# Patient Record
Sex: MALE | Race: WHITE | NOT HISPANIC OR LATINO | Employment: FULL TIME | URBAN - METROPOLITAN AREA
[De-identification: names, ages, dates, MRNs, and addresses within clinical notes are randomized per-mention and may not be internally consistent; named-entity substitution may affect disease eponyms.]

---
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
## 2020-01-24 ENCOUNTER — HISTORICAL (OUTPATIENT)
Dept: LAB | Facility: HOSPITAL | Age: 20
End: 2020-01-24

## 2020-01-24 LAB
ABS NEUT (OLG): 2.62 X10(3)/MCL (ref 2.1–9.2)
ALBUMIN SERPL-MCNC: 4.3 GM/DL (ref 3.4–5)
ALBUMIN/GLOB SERPL: 1.5 RATIO (ref 1.1–2)
ALP SERPL-CCNC: 79 UNIT/L (ref 46–116)
ALT SERPL-CCNC: 142 UNIT/L (ref 12–78)
AST SERPL-CCNC: 57 UNIT/L (ref 15–37)
BASOPHILS # BLD AUTO: 0 X10(3)/MCL (ref 0–0.2)
BASOPHILS NFR BLD AUTO: 1 %
BILIRUB SERPL-MCNC: 0.4 MG/DL (ref 0.2–1)
BILIRUBIN DIRECT+TOT PNL SERPL-MCNC: 0.11 MG/DL (ref 0–0.2)
BILIRUBIN DIRECT+TOT PNL SERPL-MCNC: 0.29 MG/DL (ref 0–0.8)
BUN SERPL-MCNC: 15.9 MG/DL (ref 7–18)
CALCIUM SERPL-MCNC: 9.3 MG/DL (ref 8.5–10.1)
CHLORIDE SERPL-SCNC: 104 MMOL/L (ref 98–107)
CHOLEST SERPL-MCNC: 176 MG/DL (ref 0–200)
CHOLEST/HDLC SERPL: 5.5 {RATIO} (ref 0–5)
CO2 SERPL-SCNC: 26.6 MMOL/L (ref 21–32)
CREAT SERPL-MCNC: 1.12 MG/DL (ref 0.6–1.3)
EOSINOPHIL # BLD AUTO: 0.1 X10(3)/MCL (ref 0–0.9)
EOSINOPHIL NFR BLD AUTO: 2 %
ERYTHROCYTE [DISTWIDTH] IN BLOOD BY AUTOMATED COUNT: 11.8 % (ref 11.5–17)
GLOBULIN SER-MCNC: 2.9 GM/DL (ref 2.4–3.5)
GLUCOSE SERPL-MCNC: 94 MG/DL (ref 74–106)
HCT VFR BLD AUTO: 44.9 % (ref 42–52)
HDLC SERPL-MCNC: 32 MG/DL (ref 40–60)
HGB BLD-MCNC: 15.6 GM/DL (ref 14–18)
IMM GRANULOCYTES # BLD AUTO: 0.01 % (ref 0–0.02)
IMM GRANULOCYTES NFR BLD AUTO: 0.2 % (ref 0–0.43)
LDLC SERPL CALC-MCNC: 103 MG/DL (ref 0–129)
LYMPHOCYTES # BLD AUTO: 1.6 X10(3)/MCL (ref 0.6–4.6)
LYMPHOCYTES NFR BLD AUTO: 34 %
MCH RBC QN AUTO: 31.7 PG (ref 27–31)
MCHC RBC AUTO-ENTMCNC: 34.7 GM/DL (ref 33–36)
MCV RBC AUTO: 91.3 FL (ref 80–94)
MONOCYTES # BLD AUTO: 0.4 X10(3)/MCL (ref 0.1–1.3)
MONOCYTES NFR BLD AUTO: 9 %
NEUTROPHILS # BLD AUTO: 2.62 X10(3)/MCL (ref 1.4–7.9)
NEUTROPHILS NFR BLD AUTO: 55 %
PLATELET # BLD AUTO: 220 X10(3)/MCL (ref 130–400)
PMV BLD AUTO: 11.3 FL (ref 9.4–12.4)
POTASSIUM SERPL-SCNC: 4.2 MMOL/L (ref 3.5–5.1)
PROT SERPL-MCNC: 7.2 GM/DL (ref 6.4–8.2)
RBC # BLD AUTO: 4.92 X10(6)/MCL (ref 4.7–6.1)
SODIUM SERPL-SCNC: 144 MMOL/L (ref 136–145)
TRIGL SERPL-MCNC: 203 MG/DL
VLDLC SERPL CALC-MCNC: 41 MG/DL
WBC # SPEC AUTO: 4.8 X10(3)/MCL (ref 4.5–11.5)

## 2020-02-05 ENCOUNTER — HISTORICAL (OUTPATIENT)
Dept: LAB | Facility: HOSPITAL | Age: 20
End: 2020-02-05

## 2020-02-05 LAB
AMPHET UR QL SCN: NORMAL
BARBITURATE SCN PRESENT UR: NORMAL
BENZODIAZ UR QL SCN: NORMAL
CANNABINOIDS UR QL SCN: NORMAL
COCAINE UR QL SCN: NORMAL
OPIATES UR QL SCN: NORMAL
PCP UR QL: NORMAL
PH UR STRIP.AUTO: 5 [PH] (ref 5–7.5)

## 2020-05-28 ENCOUNTER — HISTORICAL (OUTPATIENT)
Dept: ADMINISTRATIVE | Facility: HOSPITAL | Age: 20
End: 2020-05-28

## 2020-05-28 LAB
ALP SERPL-CCNC: 72 UNIT/L (ref 46–116)
ALT SERPL-CCNC: 99 UNIT/L (ref 12–78)
AST SERPL-CCNC: 50 UNIT/L (ref 15–37)

## 2020-06-19 ENCOUNTER — HISTORICAL (OUTPATIENT)
Dept: ADMINISTRATIVE | Facility: HOSPITAL | Age: 20
End: 2020-06-19

## 2020-06-19 LAB
HAV IGM SERPL QL IA: NONREACTIVE
HBV CORE IGM SERPL QL IA: NONREACTIVE
HBV SURFACE AG SERPL QL IA: NONREACTIVE
HCV AB SERPL QL IA: NONREACTIVE
HEPATITIS PANEL INTERP: NORMAL

## 2021-04-05 ENCOUNTER — HISTORICAL (OUTPATIENT)
Dept: LAB | Facility: HOSPITAL | Age: 21
End: 2021-04-05

## 2021-04-05 LAB
ABS NEUT (OLG): 2.97 X10(3)/MCL (ref 2.1–9.2)
ALBUMIN SERPL-MCNC: 4.3 GM/DL (ref 3.5–5)
ALBUMIN/GLOB SERPL: 1.5 RATIO (ref 1.1–2)
ALP SERPL-CCNC: 69 UNIT/L (ref 40–150)
ALT SERPL-CCNC: 33 UNIT/L (ref 0–55)
AST SERPL-CCNC: 30 UNIT/L (ref 5–34)
BASOPHILS # BLD AUTO: 0 X10(3)/MCL (ref 0–0.2)
BASOPHILS NFR BLD AUTO: 1 %
BILIRUB SERPL-MCNC: 0.4 MG/DL
BILIRUBIN DIRECT+TOT PNL SERPL-MCNC: 0.1 MG/DL (ref 0–0.5)
BILIRUBIN DIRECT+TOT PNL SERPL-MCNC: 0.3 MG/DL (ref 0–0.8)
BUN SERPL-MCNC: 17.7 MG/DL (ref 8.9–20.6)
CALCIUM SERPL-MCNC: 8.8 MG/DL (ref 8.4–10.2)
CHLORIDE SERPL-SCNC: 107 MMOL/L (ref 98–107)
CHOLEST SERPL-MCNC: 187 MG/DL
CHOLEST/HDLC SERPL: 5 {RATIO} (ref 0–5)
CO2 SERPL-SCNC: 25 MMOL/L (ref 22–29)
CREAT SERPL-MCNC: 1.1 MG/DL (ref 0.73–1.18)
DEPRECATED CALCIDIOL+CALCIFEROL SERPL-MC: 24.5 NG/ML (ref 30–80)
EOSINOPHIL # BLD AUTO: 0.1 X10(3)/MCL (ref 0–0.9)
EOSINOPHIL NFR BLD AUTO: 2 %
ERYTHROCYTE [DISTWIDTH] IN BLOOD BY AUTOMATED COUNT: 11.7 % (ref 11.5–17)
GLOBULIN SER-MCNC: 2.8 GM/DL (ref 2.4–3.5)
GLUCOSE SERPL-MCNC: 93 MG/DL (ref 74–100)
HCT VFR BLD AUTO: 45.1 % (ref 42–52)
HDLC SERPL-MCNC: 39 MG/DL (ref 35–60)
HGB BLD-MCNC: 15.2 GM/DL (ref 14–18)
IMM GRANULOCYTES # BLD AUTO: 0.02 % (ref 0–0.02)
IMM GRANULOCYTES NFR BLD AUTO: 0.4 % (ref 0–0.43)
LDLC SERPL CALC-MCNC: 111 MG/DL (ref 50–140)
LYMPHOCYTES # BLD AUTO: 1.4 X10(3)/MCL (ref 0.6–4.6)
LYMPHOCYTES NFR BLD AUTO: 29 %
MCH RBC QN AUTO: 32 PG (ref 27–31)
MCHC RBC AUTO-ENTMCNC: 33.7 GM/DL (ref 33–36)
MCV RBC AUTO: 94.9 FL (ref 80–94)
MONOCYTES # BLD AUTO: 0.4 X10(3)/MCL (ref 0.1–1.3)
MONOCYTES NFR BLD AUTO: 7 %
NEUTROPHILS # BLD AUTO: 2.97 X10(3)/MCL (ref 1.4–7.9)
NEUTROPHILS NFR BLD AUTO: 60 %
PLATELET # BLD AUTO: 187 X10(3)/MCL (ref 130–400)
PMV BLD AUTO: 10.4 FL (ref 9.4–12.4)
POTASSIUM SERPL-SCNC: 4.4 MMOL/L (ref 3.5–5.1)
PROT SERPL-MCNC: 7.1 GM/DL (ref 6.4–8.3)
RBC # BLD AUTO: 4.75 X10(6)/MCL (ref 4.7–6.1)
SODIUM SERPL-SCNC: 141 MMOL/L (ref 136–145)
TRIGL SERPL-MCNC: 186 MG/DL (ref 34–140)
TSH SERPL-ACNC: 1.99 UIU/ML (ref 0.35–4.94)
VLDLC SERPL CALC-MCNC: 37 MG/DL
WBC # SPEC AUTO: 4.9 X10(3)/MCL (ref 4.5–11.5)

## 2022-04-06 ENCOUNTER — HISTORICAL (OUTPATIENT)
Dept: LAB | Facility: HOSPITAL | Age: 22
End: 2022-04-06

## 2022-04-06 LAB
ABS NEUT (OLG): 2.76 (ref 2.1–9.2)
ALBUMIN SERPL-MCNC: 4.5 G/DL (ref 3.5–5)
ALBUMIN/GLOB SERPL: 1.6 {RATIO} (ref 1.1–2)
ALP SERPL-CCNC: 80 U/L (ref 40–150)
ALT SERPL-CCNC: 17 U/L (ref 0–55)
AST SERPL-CCNC: 19 U/L (ref 5–34)
BASOPHILS # BLD AUTO: 0.1 10*3/UL (ref 0–0.2)
BASOPHILS NFR BLD AUTO: 1 %
BILIRUB SERPL-MCNC: 0.5 MG/DL
BILIRUBIN DIRECT+TOT PNL SERPL-MCNC: 0.2 (ref 0–0.5)
BILIRUBIN DIRECT+TOT PNL SERPL-MCNC: 0.3 (ref 0–0.8)
BUN SERPL-MCNC: 17.8 MG/DL (ref 8.9–20.6)
CALCIUM SERPL-MCNC: 9.7 MG/DL (ref 8.7–10.5)
CHLORIDE SERPL-SCNC: 106 MMOL/L (ref 98–107)
CHOLEST SERPL-MCNC: 196 MG/DL
CHOLEST/HDLC SERPL: 6 {RATIO} (ref 0–5)
CO2 SERPL-SCNC: 24 MMOL/L (ref 22–29)
CREAT SERPL-MCNC: 1.11 MG/DL (ref 0.73–1.18)
DEPRECATED CALCIDIOL+CALCIFEROL SERPL-MC: 24.9 NG/ML (ref 30–80)
EOSINOPHIL # BLD AUTO: 0.3 10*3/UL (ref 0–0.9)
EOSINOPHIL NFR BLD AUTO: 6 %
ERYTHROCYTE [DISTWIDTH] IN BLOOD BY AUTOMATED COUNT: 12 % (ref 11.5–17)
EST. AVERAGE GLUCOSE BLD GHB EST-MCNC: 91.1 MG/DL
GLOBULIN SER-MCNC: 2.8 G/DL (ref 2.4–3.5)
GLUCOSE SERPL-MCNC: 97 MG/DL (ref 74–100)
HBA1C MFR BLD: 4.8 %
HCT VFR BLD AUTO: 45.2 % (ref 42–52)
HDLC SERPL-MCNC: 35 MG/DL (ref 35–60)
HEMOLYSIS INTERF INDEX SERPL-ACNC: 6
HGB BLD-MCNC: 15.7 G/DL (ref 14–18)
ICTERIC INTERF INDEX SERPL-ACNC: 1
IMM GRANULOCYTES # BLD AUTO: 0.01 10*3/UL (ref 0–0.02)
IMM GRANULOCYTES NFR BLD AUTO: 0.2 % (ref 0–0.43)
LDLC SERPL CALC-MCNC: 128 MG/DL (ref 50–140)
LIPEMIC INTERF INDEX SERPL-ACNC: 11
LYMPHOCYTES # BLD AUTO: 1.6 10*3/UL (ref 0.6–4.6)
LYMPHOCYTES NFR BLD AUTO: 30 %
MANUAL DIFF? (OHS): NO
MCH RBC QN AUTO: 31 PG (ref 27–31)
MCHC RBC AUTO-ENTMCNC: 34.7 G/DL (ref 33–36)
MCV RBC AUTO: 89.3 FL (ref 80–94)
MONOCYTES # BLD AUTO: 0.4 10*3/UL (ref 0.1–1.3)
MONOCYTES NFR BLD AUTO: 8 %
NEUTROPHILS # BLD AUTO: 2.76 10*3/UL (ref 1.4–7.9)
NEUTROPHILS NFR BLD AUTO: 54 %
PLATELET # BLD AUTO: 209 10*3/UL (ref 130–400)
PMV BLD AUTO: 10.3 FL (ref 9.4–12.4)
POTASSIUM SERPL-SCNC: 4.1 MMOL/L (ref 3.5–5.1)
PROT SERPL-MCNC: 7.3 G/DL (ref 6.4–8.3)
RBC # BLD AUTO: 5.06 10*6/UL (ref 4.7–6.1)
SODIUM SERPL-SCNC: 140 MMOL/L (ref 136–145)
TRIGL SERPL-MCNC: 166 MG/DL (ref 34–140)
TSH SERPL-ACNC: 2.79 M[IU]/L (ref 0.35–4.94)
VIT B12 SERPL-MCNC: 643 PG/ML (ref 213–816)
VLDLC SERPL CALC-MCNC: 33 MG/DL
WBC # SPEC AUTO: 5.1 10*3/UL (ref 4.5–11.5)

## 2022-04-10 ENCOUNTER — HISTORICAL (OUTPATIENT)
Dept: ADMINISTRATIVE | Facility: HOSPITAL | Age: 22
End: 2022-04-10

## 2022-04-26 VITALS
SYSTOLIC BLOOD PRESSURE: 132 MMHG | WEIGHT: 225.31 LBS | BODY MASS INDEX: 31.54 KG/M2 | HEIGHT: 71 IN | OXYGEN SATURATION: 98 % | DIASTOLIC BLOOD PRESSURE: 76 MMHG

## 2022-07-25 RX ORDER — MUPIROCIN 20 MG/G
OINTMENT TOPICAL 3 TIMES DAILY
COMMUNITY
Start: 2022-07-19 | End: 2022-11-29

## 2022-07-25 RX ORDER — OXCARBAZEPINE 600 MG/1
600 TABLET, FILM COATED ORAL 2 TIMES DAILY
COMMUNITY
Start: 2022-04-11 | End: 2022-08-01 | Stop reason: SDUPTHER

## 2022-07-25 RX ORDER — SERTRALINE HYDROCHLORIDE 100 MG/1
100 TABLET, FILM COATED ORAL DAILY
Qty: 90 TABLET | Refills: 0 | Status: SHIPPED | OUTPATIENT
Start: 2022-07-25 | End: 2023-04-17 | Stop reason: SDUPTHER

## 2022-07-25 RX ORDER — AMOXICILLIN AND CLAVULANATE POTASSIUM 875; 125 MG/1; MG/1
1 TABLET, FILM COATED ORAL 2 TIMES DAILY
COMMUNITY
Start: 2022-07-19 | End: 2022-11-29 | Stop reason: ALTCHOICE

## 2022-07-25 RX ORDER — SERTRALINE HYDROCHLORIDE 100 MG/1
100 TABLET, FILM COATED ORAL DAILY
COMMUNITY
Start: 2022-04-11 | End: 2022-07-25 | Stop reason: SDUPTHER

## 2022-08-02 RX ORDER — OXCARBAZEPINE 600 MG/1
600 TABLET, FILM COATED ORAL 2 TIMES DAILY
Qty: 180 TABLET | Refills: 3 | Status: SHIPPED | OUTPATIENT
Start: 2022-08-02 | End: 2023-04-17 | Stop reason: SDUPTHER

## 2022-11-29 ENCOUNTER — OFFICE VISIT (OUTPATIENT)
Dept: FAMILY MEDICINE | Facility: CLINIC | Age: 22
End: 2022-11-29
Payer: COMMERCIAL

## 2022-11-29 VITALS
OXYGEN SATURATION: 98 % | TEMPERATURE: 98 F | SYSTOLIC BLOOD PRESSURE: 119 MMHG | BODY MASS INDEX: 28.71 KG/M2 | HEART RATE: 89 BPM | RESPIRATION RATE: 16 BRPM | WEIGHT: 212 LBS | DIASTOLIC BLOOD PRESSURE: 79 MMHG | HEIGHT: 72 IN

## 2022-11-29 DIAGNOSIS — R09.81 NASAL CONGESTION: ICD-10-CM

## 2022-11-29 DIAGNOSIS — J01.10 ACUTE FRONTAL SINUSITIS, RECURRENCE NOT SPECIFIED: Primary | ICD-10-CM

## 2022-11-29 PROCEDURE — 99213 PR OFFICE/OUTPT VISIT, EST, LEVL III, 20-29 MIN: ICD-10-PCS | Mod: ,,, | Performed by: NURSE PRACTITIONER

## 2022-11-29 PROCEDURE — 99213 OFFICE O/P EST LOW 20 MIN: CPT | Mod: ,,, | Performed by: NURSE PRACTITIONER

## 2022-11-29 PROCEDURE — 3078F DIAST BP <80 MM HG: CPT | Mod: CPTII,,, | Performed by: NURSE PRACTITIONER

## 2022-11-29 PROCEDURE — 1159F PR MEDICATION LIST DOCUMENTED IN MEDICAL RECORD: ICD-10-PCS | Mod: CPTII,,, | Performed by: NURSE PRACTITIONER

## 2022-11-29 PROCEDURE — 3008F PR BODY MASS INDEX (BMI) DOCUMENTED: ICD-10-PCS | Mod: CPTII,,, | Performed by: NURSE PRACTITIONER

## 2022-11-29 PROCEDURE — 3078F PR MOST RECENT DIASTOLIC BLOOD PRESSURE < 80 MM HG: ICD-10-PCS | Mod: CPTII,,, | Performed by: NURSE PRACTITIONER

## 2022-11-29 PROCEDURE — 3074F PR MOST RECENT SYSTOLIC BLOOD PRESSURE < 130 MM HG: ICD-10-PCS | Mod: CPTII,,, | Performed by: NURSE PRACTITIONER

## 2022-11-29 PROCEDURE — 1159F MED LIST DOCD IN RCRD: CPT | Mod: CPTII,,, | Performed by: NURSE PRACTITIONER

## 2022-11-29 PROCEDURE — 3008F BODY MASS INDEX DOCD: CPT | Mod: CPTII,,, | Performed by: NURSE PRACTITIONER

## 2022-11-29 PROCEDURE — 3074F SYST BP LT 130 MM HG: CPT | Mod: CPTII,,, | Performed by: NURSE PRACTITIONER

## 2022-11-29 RX ORDER — BENZONATATE 200 MG/1
200 CAPSULE ORAL 3 TIMES DAILY PRN
Qty: 30 CAPSULE | Refills: 0 | Status: SHIPPED | OUTPATIENT
Start: 2022-11-29 | End: 2022-12-09

## 2022-11-29 RX ORDER — PREDNISONE 20 MG/1
TABLET ORAL
Qty: 8 TABLET | Refills: 0 | Status: SHIPPED | OUTPATIENT
Start: 2022-11-29

## 2022-11-29 RX ORDER — AMOXICILLIN AND CLAVULANATE POTASSIUM 875; 125 MG/1; MG/1
1 TABLET, FILM COATED ORAL EVERY 12 HOURS
Qty: 14 TABLET | Refills: 0 | Status: SHIPPED | OUTPATIENT
Start: 2022-11-29 | End: 2022-12-06

## 2022-11-29 NOTE — PROGRESS NOTES
SUBJECTIVE:     History of Present Illness      Chief Complaint: Follow-up (C/O cough, congestion, sore throat, headache x 1 day.  No fever.  Productive cough at times.)    HPI:  Patient is a 22 y.o. year old male who presents to clinic for nasal congestion ,sore throat ,cough and headache for the last day.    Patient denies fever denies chills denies loss of taste or smell. . Denies sick contact    Review of Systems:  General: Denies fever, chills, fatigue, myalgias, and change in appetite   Eyes: Denies change in vision, eye redness, eye drainage, eye pain  ENT:  +rhinorrhea, nasal congestion, sore throat,   Resp: Denies wheezing, and shortness of breath   Cardio: Denies chest pain, palpitations, pleuritic pain, and edema   GI: Denies nausea, vomiting, diarrhea, and abdominal pain   : Denies dysuria, hematuria, and discharge   MSK: Denies trauma, joint pain, and trouble ambulating   Neuro: Denies LOC, dizziness, seizure like activity, and focal deficits   Skin: Denies rashes, open lesions and ulcers      Previous History      Review of patient's allergies indicates:  No Known Allergies    Past Medical History:   Diagnosis Date    Depression     Seizures      Current Outpatient Medications   Medication Instructions    amoxicillin-clavulanate 875-125mg (AUGMENTIN) 875-125 mg per tablet 1 tablet, Oral, 2 times daily    amoxicillin-clavulanate 875-125mg (AUGMENTIN) 875-125 mg per tablet 1 tablet, Oral, Every 12 hours    benzonatate (TESSALON) 200 mg, Oral, 3 times daily PRN    mupirocin (BACTROBAN) 2 % ointment Topical (Top), 3 times daily    OXcarbazepine (TRILEPTAL) 600 mg, Oral, 2 times daily    predniSONE (DELTASONE) 20 MG tablet One tablet orally twice daily for 3 days and then once daily for 2 days    sertraline (ZOLOFT) 100 mg, Oral, Daily     Past Surgical History:   Procedure Laterality Date    TONSILLECTOMY       History reviewed. No pertinent family history.    Social History     Tobacco Use    Smoking  status: Never    Smokeless tobacco: Never   Substance Use Topics    Alcohol use: Yes     Comment: socially once a week    Drug use: Never        Health Maintenance      Health Maintenance   Topic Date Due    TETANUS VACCINE  Never done    Hepatitis C Screening  Completed    Lipid Panel  Completed    HPV Vaccines  Completed       OBJECTIVE:     Physical Exam      Vital Signs Reviewed   /79   Pulse 89   Temp 98.3 °F (36.8 °C)   Resp 16   Ht 6' (1.829 m)   Wt 96.2 kg (212 lb)   SpO2 98%   BMI 28.75 kg/m²     Physical Exam:  General: Alert, well nourished, no acute distress, non-toxic appearing.   Eyes: Anicteric sclera, without conjunctival injection, normal lids, no purulent drainage, EOMs grossly intact.   Ears: No tragal tenderness. Tympanic membranes intact, pearly grey, without effusion or erythema and with a positive light reflex.   Mouth: Posterior pharynx  streaky. No exudate, ulcerations, or lesion. No tonsillar swelling.   Sinus tenderness pressure   Neck: Supple, full ROM, no rigidity, no cervical adenopathy.   Cardio: Normal rate and rhythm    Resp: Respirations even and unlabored, clear to auscultation bilaterally.   Skin: No rashes or open lesions noted.   MSK: No swelling. No abrasions or signs of trauma. Ambulating without assistance.   Neuro: Alert,oriented No focal deficits noted. Facial expressions even.   Psych: Cooperative, Normal affect      Procedures    Procedures     Labs     Results for orders placed or performed in visit on 04/06/22   CBC Auto Differential   Result Value Ref Range    WBC 5.1 4.5 - 11.5    RBC 5.06 4.70 - 6.10    Hgb 15.7 14.0 - 18.0    Hct 45.2 42.0 - 52.0    MCV 89.3 80.0 - 94.0    MCH 31.0 27.0 - 31.0    MCHC 34.7 33.0 - 36.0    RDW 12.0 11.5 - 17.0    Platelet 209 130 - 400    MPV 10.3 9.4 - 12.4    Abs Neut 2.76 2.10 - 9.20    Manual Diff? No    Differential   Result Value Ref Range    Neut % 54     Lymph % 30     Mono % 8     Eos % 6     Basophil % 1      Lymph # 1.6 0.6 - 4.6    Neut # 2.76 1.40 - 7.90    Mono # 0.4 0.1 - 1.3    Eos # 0.3 0.0 - 0.9    Baso # 0.1 0.0 - 0.2    IG# 0.0100 0.0000 - 0.0155    IG% 0.200 0.000 - 0.430   Hemoglobin A1C   Result Value Ref Range    Hemoglobin A1c 4.8 <=7.0    Estimated Average Glucose 91.1    TSH   Result Value Ref Range    Thyroid Stimulating Hormone 2.7850 0.3500 - 4.9400   Vitamin B12   Result Value Ref Range    Vitamin B12 Level 643 213 - 816   Comprehensive Metabolic Panel   Result Value Ref Range    Sodium Level 140 136 - 145    Potassium Level 4.1 3.5 - 5.1    Chloride 106 98 - 107    Carbon Dioxide 24 22 - 29    Glucose Level 97 74 - 100    Blood Urea Nitrogen 17.8 8.9 - 20.6    Creatinine 1.11 0.73 - 1.18    Calcium Level Total 9.7 8.7 - 10.5    Albumin Level 4.5 3.5 - 5.0    Protein Total 7.3 6.4 - 8.3    Globulin 2.8 2.4 - 3.5    Albumin/Globulin Ratio 1.6 1.1 - 2.0    Alkaline Phosphatase 80 40 - 150    Bilirubin Total 0.5 <=1.5    Bilirubin Direct 0.2 0.0 - 0.5    Bilirubin Indirect 0.30 0.00 - 0.80    Aspartate Aminotransferase 19 5 - 34    Alanine Aminotransferase 17 0 - 55    Hemolysis 6     Icterus 1     Lipemia 11    Lipid Panel   Result Value Ref Range    Cholesterol Total 196 <=200    HDL Cholesterol 35 35 - 60    Triglyceride 166 34 - 140    Very Low Density Lipoprotein 33     Cholesterol/HDL Ratio 6 0 - 5    LDL Cholesterol 128.00 50.00 - 140.00   GFR, Estimated   Result Value Ref Range    Estimated GFR- >60     Estimated GFR-Non African American >60    Vitamin D   Result Value Ref Range    Vit D 25 OH 24.9 30.0 - 80.0        Assessment       1. Acute frontal sinusitis, recurrence not specified    2. Nasal congestion             Plan       Orders Placed This Encounter    amoxicillin-clavulanate 875-125mg (AUGMENTIN) 875-125 mg per tablet    benzonatate (TESSALON) 200 MG capsule    predniSONE (DELTASONE) 20 MG tablet      Medication List with Changes/Refills   New Medications     AMOXICILLIN-CLAVULANATE 875-125MG (AUGMENTIN) 875-125 MG PER TABLET    Take 1 tablet by mouth every 12 (twelve) hours. for 7 days    BENZONATATE (TESSALON) 200 MG CAPSULE    Take 1 capsule (200 mg total) by mouth 3 (three) times daily as needed for Cough.    PREDNISONE (DELTASONE) 20 MG TABLET    One tablet orally twice daily for 3 days and then once daily for 2 days   Current Medications    AMOXICILLIN-CLAVULANATE 875-125MG (AUGMENTIN) 875-125 MG PER TABLET    Take 1 tablet by mouth 2 (two) times daily.    MUPIROCIN (BACTROBAN) 2 % OINTMENT    Apply topically 3 (three) times daily.    OXCARBAZEPINE (TRILEPTAL) 600 MG TAB    Take 1 tablet (600 mg total) by mouth 2 (two) times daily.    SERTRALINE (ZOLOFT) 100 MG TABLET    Take 1 tablet (100 mg total) by mouth once daily.           Future Appointments   Date Time Provider Department Center   4/14/2023  9:40 AM CHRISTIANO Marinelli

## 2023-04-10 ENCOUNTER — LAB VISIT (OUTPATIENT)
Dept: LAB | Facility: HOSPITAL | Age: 23
End: 2023-04-10
Attending: NURSE PRACTITIONER
Payer: COMMERCIAL

## 2023-04-10 ENCOUNTER — TELEPHONE (OUTPATIENT)
Dept: FAMILY MEDICINE | Facility: CLINIC | Age: 23
End: 2023-04-10
Payer: COMMERCIAL

## 2023-04-10 DIAGNOSIS — Z00.00 ENCOUNTER FOR WELLNESS EXAMINATION: ICD-10-CM

## 2023-04-10 DIAGNOSIS — Z00.00 ENCOUNTER FOR WELLNESS EXAMINATION: Primary | ICD-10-CM

## 2023-04-10 LAB
ALBUMIN SERPL-MCNC: 4.5 G/DL (ref 3.5–5)
ALBUMIN/GLOB SERPL: 1.4 RATIO (ref 1.1–2)
ALP SERPL-CCNC: 70 UNIT/L (ref 40–150)
ALT SERPL-CCNC: 18 UNIT/L (ref 0–55)
AST SERPL-CCNC: 28 UNIT/L (ref 5–34)
BASOPHILS # BLD AUTO: 0.07 X10(3)/MCL (ref 0–0.2)
BASOPHILS NFR BLD AUTO: 1.3 %
BILIRUBIN DIRECT+TOT PNL SERPL-MCNC: 0.3 MG/DL
BUN SERPL-MCNC: 14.7 MG/DL (ref 8.9–20.6)
CALCIUM SERPL-MCNC: 9.9 MG/DL (ref 8.4–10.2)
CHLORIDE SERPL-SCNC: 107 MMOL/L (ref 98–107)
CHOLEST SERPL-MCNC: 216 MG/DL
CHOLEST/HDLC SERPL: 7 {RATIO} (ref 0–5)
CO2 SERPL-SCNC: 23 MMOL/L (ref 22–29)
CREAT SERPL-MCNC: 1.16 MG/DL (ref 0.73–1.18)
DEPRECATED CALCIDIOL+CALCIFEROL SERPL-MC: 21.7 NG/ML (ref 30–80)
EOSINOPHIL # BLD AUTO: 0.24 X10(3)/MCL (ref 0–0.9)
EOSINOPHIL NFR BLD AUTO: 4.5 %
ERYTHROCYTE [DISTWIDTH] IN BLOOD BY AUTOMATED COUNT: 12.2 % (ref 11.5–17)
EST. AVERAGE GLUCOSE BLD GHB EST-MCNC: 91.1 MG/DL
GFR SERPLBLD CREATININE-BSD FMLA CKD-EPI: >60 MLS/MIN/1.73/M2
GLOBULIN SER-MCNC: 3.2 GM/DL (ref 2.4–3.5)
GLUCOSE SERPL-MCNC: 93 MG/DL (ref 74–100)
HBA1C MFR BLD: 4.8 %
HCT VFR BLD AUTO: 46.5 % (ref 42–52)
HDLC SERPL-MCNC: 32 MG/DL (ref 35–60)
HGB BLD-MCNC: 15.6 G/DL (ref 14–18)
IMM GRANULOCYTES # BLD AUTO: 0.01 X10(3)/MCL (ref 0–0.04)
IMM GRANULOCYTES NFR BLD AUTO: 0.2 %
LDLC SERPL CALC-MCNC: 83 MG/DL (ref 50–140)
LYMPHOCYTES # BLD AUTO: 1.6 X10(3)/MCL (ref 0.6–4.6)
LYMPHOCYTES NFR BLD AUTO: 29.9 %
MCH RBC QN AUTO: 30.9 PG (ref 27–31)
MCHC RBC AUTO-ENTMCNC: 33.5 G/DL (ref 33–36)
MCV RBC AUTO: 92.1 FL (ref 80–94)
MONOCYTES # BLD AUTO: 0.4 X10(3)/MCL (ref 0.1–1.3)
MONOCYTES NFR BLD AUTO: 7.5 %
NEUTROPHILS # BLD AUTO: 3.04 X10(3)/MCL (ref 2.1–9.2)
NEUTROPHILS NFR BLD AUTO: 56.6 %
PLATELET # BLD AUTO: 238 X10(3)/MCL (ref 130–400)
PMV BLD AUTO: 10.2 FL (ref 7.4–10.4)
POTASSIUM SERPL-SCNC: 4.2 MMOL/L (ref 3.5–5.1)
PROT SERPL-MCNC: 7.7 GM/DL (ref 6.4–8.3)
RBC # BLD AUTO: 5.05 X10(6)/MCL (ref 4.7–6.1)
SODIUM SERPL-SCNC: 142 MMOL/L (ref 136–145)
TRIGL SERPL-MCNC: 504 MG/DL (ref 34–140)
TSH SERPL-ACNC: 3.76 UIU/ML (ref 0.35–4.94)
VLDLC SERPL CALC-MCNC: 101 MG/DL
WBC # SPEC AUTO: 5.4 X10(3)/MCL (ref 4.5–11.5)

## 2023-04-10 PROCEDURE — 80053 COMPREHEN METABOLIC PANEL: CPT

## 2023-04-10 PROCEDURE — 80061 LIPID PANEL: CPT

## 2023-04-10 PROCEDURE — 83036 HEMOGLOBIN GLYCOSYLATED A1C: CPT

## 2023-04-10 PROCEDURE — 85025 COMPLETE CBC W/AUTO DIFF WBC: CPT

## 2023-04-10 PROCEDURE — 36415 COLL VENOUS BLD VENIPUNCTURE: CPT

## 2023-04-10 PROCEDURE — 84443 ASSAY THYROID STIM HORMONE: CPT

## 2023-04-10 PROCEDURE — 82306 VITAMIN D 25 HYDROXY: CPT

## 2023-04-14 ENCOUNTER — OFFICE VISIT (OUTPATIENT)
Dept: FAMILY MEDICINE | Facility: CLINIC | Age: 23
End: 2023-04-14
Payer: COMMERCIAL

## 2023-04-14 VITALS
HEIGHT: 72 IN | BODY MASS INDEX: 28.85 KG/M2 | WEIGHT: 213 LBS | SYSTOLIC BLOOD PRESSURE: 126 MMHG | RESPIRATION RATE: 18 BRPM | HEART RATE: 78 BPM | DIASTOLIC BLOOD PRESSURE: 79 MMHG | OXYGEN SATURATION: 96 %

## 2023-04-14 DIAGNOSIS — F84.0 AUTISM: ICD-10-CM

## 2023-04-14 DIAGNOSIS — Z00.00 WELLNESS EXAMINATION: Primary | ICD-10-CM

## 2023-04-14 DIAGNOSIS — E78.00 ELEVATED CHOLESTEROL: ICD-10-CM

## 2023-04-14 DIAGNOSIS — G40.909 SEIZURE DISORDER: ICD-10-CM

## 2023-04-14 PROCEDURE — 1159F MED LIST DOCD IN RCRD: CPT | Mod: CPTII,,, | Performed by: NURSE PRACTITIONER

## 2023-04-14 PROCEDURE — 99395 PREV VISIT EST AGE 18-39: CPT | Mod: ,,, | Performed by: NURSE PRACTITIONER

## 2023-04-14 PROCEDURE — 3074F SYST BP LT 130 MM HG: CPT | Mod: CPTII,,, | Performed by: NURSE PRACTITIONER

## 2023-04-14 PROCEDURE — 1159F PR MEDICATION LIST DOCUMENTED IN MEDICAL RECORD: ICD-10-PCS | Mod: CPTII,,, | Performed by: NURSE PRACTITIONER

## 2023-04-14 PROCEDURE — 3078F DIAST BP <80 MM HG: CPT | Mod: CPTII,,, | Performed by: NURSE PRACTITIONER

## 2023-04-14 PROCEDURE — 99395 PR PREVENTIVE VISIT,EST,18-39: ICD-10-PCS | Mod: ,,, | Performed by: NURSE PRACTITIONER

## 2023-04-14 PROCEDURE — 3008F BODY MASS INDEX DOCD: CPT | Mod: CPTII,,, | Performed by: NURSE PRACTITIONER

## 2023-04-14 PROCEDURE — 3008F PR BODY MASS INDEX (BMI) DOCUMENTED: ICD-10-PCS | Mod: CPTII,,, | Performed by: NURSE PRACTITIONER

## 2023-04-14 PROCEDURE — 3074F PR MOST RECENT SYSTOLIC BLOOD PRESSURE < 130 MM HG: ICD-10-PCS | Mod: CPTII,,, | Performed by: NURSE PRACTITIONER

## 2023-04-14 PROCEDURE — 3078F PR MOST RECENT DIASTOLIC BLOOD PRESSURE < 80 MM HG: ICD-10-PCS | Mod: CPTII,,, | Performed by: NURSE PRACTITIONER

## 2023-04-14 NOTE — PROGRESS NOTES
SUBJECTIVE:     History of Present Illness      Chief Complaint: Annual Exam (Wellness Exam /)    HPI:  Patient is a 22 y.o. year old male who presents to clinic for  for annual wellness.    PMH seizure disorder anxiety.   Reports last seizure 2016 , no seizure activity.    Doing well with medication taking   as prescribed.  Denies headaches, dizziness, chest pain, shortness of breath or palpitations.  Smoking status none  Drinks sodas few times a week.   He reports eating a lot of fast food, fried and fatty food.        Review of Systems:    Review of Systems    12 point review of systems conducted, negative except as stated in the history of present illness. See HPI for details.     Previous History      Review of patient's allergies indicates:  No Known Allergies    Past Medical History:   Diagnosis Date    Depression     Seizures      Current Outpatient Medications   Medication Instructions    OXcarbazepine (TRILEPTAL) 600 mg, Oral, 2 times daily    predniSONE (DELTASONE) 20 MG tablet One tablet orally twice daily for 3 days and then once daily for 2 days    sertraline (ZOLOFT) 100 mg, Oral, Daily     Past Surgical History:   Procedure Laterality Date    TONSILLECTOMY       History reviewed. No pertinent family history.    Social History     Tobacco Use    Smoking status: Never    Smokeless tobacco: Never   Substance Use Topics    Alcohol use: Yes     Comment: socially once a week    Drug use: Never        Health Maintenance      Health Maintenance   Topic Date Due    TETANUS VACCINE  07/19/2032    Hepatitis C Screening  Completed    Lipid Panel  Completed    HPV Vaccines  Completed       OBJECTIVE:     Physical Exam      Vital Signs Reviewed   Visit Vitals  /79 (BP Location: Left arm, Patient Position: Sitting, BP Method: Small (Automatic))   Pulse 78   Resp 18   Ht 6' (1.829 m)   Wt 96.6 kg (213 lb)   SpO2 96%   BMI 28.89 kg/m²       Physical Exam    Physical Exam:  General: Alert, well nourished, no  acute distress, non-toxic appearing.   Eyes: Anicteric sclera, without conjunctival injection, normal lids, no purulent drainage, EOMs grossly intact.   Ears: No tragal tenderness. Tympanic membranes intact, pearly grey, without effusion or erythema and with a positive light reflex.   Mouth: Posterior pharynx without erythema. No exudate, ulcerations, or lesion. No tonsillar swelling.   Neck: Supple, full ROM, no rigidity, no cervical adenopathy.   Cardio: Normal rate and rhythm    Resp: Respirations even and unlabored, clear to auscultation bilaterally.   Abd: No ecchymosis or distension. Normal bowel sounds in all 4 quadrants. No tenderness to palpation. No rebound tenderness or guarding. No CVA tenderness.   Skin: No rashes or open lesions noted.   MSK: No swelling. No abrasions or signs of trauma. Ambulating without assistance.   Neuro: Alert,oriented No focal deficits noted. Facial expressions even.   Psych: Cooperative, Normal affect      Procedures    Procedures     Labs     Results for orders placed or performed in visit on 04/10/23   Comprehensive Metabolic Panel   Result Value Ref Range    Sodium Level 142 136 - 145 mmol/L    Potassium Level 4.2 3.5 - 5.1 mmol/L    Chloride 107 98 - 107 mmol/L    Carbon Dioxide 23 22 - 29 mmol/L    Glucose Level 93 74 - 100 mg/dL    Blood Urea Nitrogen 14.7 8.9 - 20.6 mg/dL    Creatinine 1.16 0.73 - 1.18 mg/dL    Calcium Level Total 9.9 8.4 - 10.2 mg/dL    Protein Total 7.7 6.4 - 8.3 gm/dL    Albumin Level 4.5 3.5 - 5.0 g/dL    Globulin 3.2 2.4 - 3.5 gm/dL    Albumin/Globulin Ratio 1.4 1.1 - 2.0 ratio    Bilirubin Total 0.3 <=1.5 mg/dL    Alkaline Phosphatase 70 40 - 150 unit/L    Alanine Aminotransferase 18 0 - 55 unit/L    Aspartate Aminotransferase 28 5 - 34 unit/L    eGFR >60 mls/min/1.73/m2   TSH   Result Value Ref Range    Thyroid Stimulating Hormone 3.756 0.350 - 4.940 uIU/mL   Hemoglobin A1C   Result Value Ref Range    Hemoglobin A1c 4.8 <=7.0 %    Estimated  Average Glucose 91.1 mg/dL   Vitamin D   Result Value Ref Range    Vit D 25 OH 21.7 (L) 30.0 - 80.0 ng/mL   Lipid Panel   Result Value Ref Range    Cholesterol Total 216 (H) <=200 mg/dL    HDL Cholesterol 32 (L) 35 - 60 mg/dL    Triglyceride 504 (H) 34 - 140 mg/dL    Cholesterol/HDL Ratio 7 (H) 0 - 5    Very Low Density Lipoprotein 101     LDL Cholesterol 83.00 50.00 - 140.00 mg/dL   CBC with Differential   Result Value Ref Range    WBC 5.4 4.5 - 11.5 x10(3)/mcL    RBC 5.05 4.70 - 6.10 x10(6)/mcL    Hgb 15.6 14.0 - 18.0 g/dL    Hct 46.5 42.0 - 52.0 %    MCV 92.1 80.0 - 94.0 fL    MCH 30.9 27.0 - 31.0 pg    MCHC 33.5 33.0 - 36.0 g/dL    RDW 12.2 11.5 - 17.0 %    Platelet 238 130 - 400 x10(3)/mcL    MPV 10.2 7.4 - 10.4 fL    Neut % 56.6 %    Lymph % 29.9 %    Mono % 7.5 %    Eos % 4.5 %    Basophil % 1.3 %    Lymph # 1.60 0.6 - 4.6 x10(3)/mcL    Neut # 3.04 2.1 - 9.2 x10(3)/mcL    Mono # 0.40 0.1 - 1.3 x10(3)/mcL    Eos # 0.24 0 - 0.9 x10(3)/mcL    Baso # 0.07 0 - 0.2 x10(3)/mcL    IG# 0.01 0 - 0.04 x10(3)/mcL    IG% 0.2 %       Chemistry:  Lab Results   Component Value Date     04/10/2023    K 4.2 04/10/2023    CHLORIDE 107 04/10/2023    BUN 14.7 04/10/2023    CREATININE 1.16 04/10/2023    EGFRNORACEVR >60 04/10/2023    GLUCOSE 93 04/10/2023    CALCIUM 9.9 04/10/2023    ALKPHOS 70 04/10/2023    LABPROT 7.7 04/10/2023    ALBUMIN 4.5 04/10/2023    BILIDIR 0.2 04/06/2022    IBILI 0.30 04/06/2022    AST 28 04/10/2023    ALT 18 04/10/2023    [id number] 21.7 (L) 04/10/2023    TSH 3.756 04/10/2023        Lab Results   Component Value Date    HGBA1C 4.8 04/10/2023        Hematology:  Lab Results   Component Value Date    WBC 5.4 04/10/2023    HGB 15.6 04/10/2023    HCT 46.5 04/10/2023     04/10/2023       Lipid Panel:  Lab Results   Component Value Date    CHOL 216 (H) 04/10/2023    HDL 32 (L) 04/10/2023    LDL 83.00 04/10/2023    TRIG 504 (H) 04/10/2023    TOTALCHOLEST 7 (H) 04/10/2023        Urine:  Lab  Results   Component Value Date    PHUA 5.0 02/05/2020         Assessment       1. Wellness examination    2. Elevated cholesterol    3. Autism    4. Seizure disorder           Plan       1. Wellness examination  Discussed labs and preventative screenings   Overall health status reviewed.    Significant chronic conditions addressed, including ongoing treatment plans.   Good health habits reinforced.    Cardiovascular disease risk factors discussed.   Appropriate recommendations and preventative care medical   information provided with annual wellness exams encouraged.  Vaccination status        2. Elevated cholesterol  Elevated triglycerides    Latest Reference Range & Units 04/10/23 08:06   Cholesterol <=200 mg/dL 216 (H)   HDL 35 - 60 mg/dL 32 (L)   LDL Cholesterol External 50.00 - 140.00 mg/dL 83.00   Total Cholesterol/HDL Ratio 0 - 5  7 (H)   Triglycerides 34 - 140 mg/dL 504 (H)   Very Low Density Lipoprotein  101     - Lipid Panel; Future repeat in 3-4 months   TLC discussed   Low fat, low cholesterol diet .  Increase dietary fiber  Avoid fried, fatty , high saturated fats.  Add flaxseed or fish oil omega supplement to diet .   exercise to reduce LDL and raise HDL. Exercise at least 30 mins a day as tolerated   Education provided     3. Autism  - sertraline (ZOLOFT) 100 MG tablet; Take 1 tablet (100 mg total) by mouth once daily.  Dispense: 90 tablet; Refill: 0  Denies SI/HI    4. Seizure disorder  - OXcarbazepine (TRILEPTAL) 600 MG Tab; Take 1 tablet (600 mg total) by mouth 2 (two) times daily.  Dispense: 180 tablet; Refill: 3  No reported seizures   Stable     Orders Placed This Encounter    Lipid Panel    OXcarbazepine (TRILEPTAL) 600 MG Tab    sertraline (ZOLOFT) 100 MG tablet      Medication List with Changes/Refills   Current Medications    PREDNISONE (DELTASONE) 20 MG TABLET    One tablet orally twice daily for 3 days and then once daily for 2 days   Changed and/or Refilled Medications    Modified  Medication Previous Medication    OXCARBAZEPINE (TRILEPTAL) 600 MG TAB OXcarbazepine (TRILEPTAL) 600 MG Tab       Take 1 tablet (600 mg total) by mouth 2 (two) times daily.    Take 1 tablet (600 mg total) by mouth 2 (two) times daily.    SERTRALINE (ZOLOFT) 100 MG TABLET sertraline (ZOLOFT) 100 MG tablet       Take 1 tablet (100 mg total) by mouth once daily.    Take 1 tablet (100 mg total) by mouth once daily.       Follow up in about 3 months (around 7/14/2023).     Future Appointments   Date Time Provider Department Center   7/14/2023  9:00 AM CHRISTIANO Marinelli [facility]

## 2023-04-17 RX ORDER — SERTRALINE HYDROCHLORIDE 100 MG/1
100 TABLET, FILM COATED ORAL DAILY
Qty: 90 TABLET | Refills: 0 | Status: SHIPPED | OUTPATIENT
Start: 2023-04-17 | End: 2024-03-07

## 2023-04-17 RX ORDER — OXCARBAZEPINE 600 MG/1
600 TABLET, FILM COATED ORAL 2 TIMES DAILY
Qty: 180 TABLET | Refills: 3 | Status: SHIPPED | OUTPATIENT
Start: 2023-04-17

## 2024-03-07 DIAGNOSIS — F84.0 AUTISM: ICD-10-CM

## 2024-03-07 RX ORDER — SERTRALINE HYDROCHLORIDE 100 MG/1
TABLET, FILM COATED ORAL
Qty: 30 TABLET | Refills: 0 | Status: SHIPPED | OUTPATIENT
Start: 2024-03-07 | End: 2024-04-15

## 2024-04-15 DIAGNOSIS — F84.0 AUTISM: ICD-10-CM

## 2024-04-15 RX ORDER — SERTRALINE HYDROCHLORIDE 100 MG/1
TABLET, FILM COATED ORAL
Qty: 30 TABLET | Refills: 0 | Status: SHIPPED | OUTPATIENT
Start: 2024-04-15 | End: 2024-06-12

## 2024-05-09 DIAGNOSIS — E78.00 ELEVATED CHOLESTEROL: ICD-10-CM

## 2024-05-09 DIAGNOSIS — Z00.00 WELLNESS EXAMINATION: Primary | ICD-10-CM

## 2024-05-20 ENCOUNTER — LAB VISIT (OUTPATIENT)
Dept: LAB | Facility: HOSPITAL | Age: 24
End: 2024-05-20
Attending: NURSE PRACTITIONER
Payer: COMMERCIAL

## 2024-05-20 DIAGNOSIS — Z00.00 WELLNESS EXAMINATION: ICD-10-CM

## 2024-05-20 DIAGNOSIS — E78.00 ELEVATED CHOLESTEROL: ICD-10-CM

## 2024-05-20 LAB
25(OH)D3+25(OH)D2 SERPL-MCNC: 45 NG/ML (ref 30–80)
ALBUMIN SERPL-MCNC: 4.1 G/DL (ref 3.5–5)
ALBUMIN/GLOB SERPL: 1.3 RATIO (ref 1.1–2)
ALP SERPL-CCNC: 64 UNIT/L (ref 40–150)
ALT SERPL-CCNC: 20 UNIT/L (ref 0–55)
ANION GAP SERPL CALC-SCNC: 5 MEQ/L
AST SERPL-CCNC: 18 UNIT/L (ref 5–34)
BASOPHILS # BLD AUTO: 0.04 X10(3)/MCL
BASOPHILS NFR BLD AUTO: 0.8 %
BILIRUB SERPL-MCNC: 0.5 MG/DL
BUN SERPL-MCNC: 17.9 MG/DL (ref 8.9–20.6)
CALCIUM SERPL-MCNC: 9.4 MG/DL (ref 8.4–10.2)
CHLORIDE SERPL-SCNC: 105 MMOL/L (ref 98–107)
CHOLEST SERPL-MCNC: 171 MG/DL
CHOLEST/HDLC SERPL: 5 {RATIO} (ref 0–5)
CO2 SERPL-SCNC: 29 MMOL/L (ref 22–29)
CREAT SERPL-MCNC: 1.16 MG/DL (ref 0.73–1.18)
CREAT/UREA NIT SERPL: 15
EOSINOPHIL # BLD AUTO: 0.35 X10(3)/MCL (ref 0–0.9)
EOSINOPHIL NFR BLD AUTO: 6.7 %
ERYTHROCYTE [DISTWIDTH] IN BLOOD BY AUTOMATED COUNT: 12.1 % (ref 11.5–17)
EST. AVERAGE GLUCOSE BLD GHB EST-MCNC: 96.8 MG/DL
GFR SERPLBLD CREATININE-BSD FMLA CKD-EPI: >60 ML/MIN/1.73/M2
GLOBULIN SER-MCNC: 3.1 GM/DL (ref 2.4–3.5)
GLUCOSE SERPL-MCNC: 95 MG/DL (ref 74–100)
HBA1C MFR BLD: 5 %
HCT VFR BLD AUTO: 44.5 % (ref 42–52)
HDLC SERPL-MCNC: 36 MG/DL (ref 35–60)
HGB BLD-MCNC: 15.4 G/DL (ref 14–18)
IMM GRANULOCYTES # BLD AUTO: 0.02 X10(3)/MCL (ref 0–0.04)
IMM GRANULOCYTES NFR BLD AUTO: 0.4 %
LDLC SERPL CALC-MCNC: 99 MG/DL (ref 50–140)
LYMPHOCYTES # BLD AUTO: 1.4 X10(3)/MCL (ref 0.6–4.6)
LYMPHOCYTES NFR BLD AUTO: 26.9 %
MCH RBC QN AUTO: 31.4 PG (ref 27–31)
MCHC RBC AUTO-ENTMCNC: 34.6 G/DL (ref 33–36)
MCV RBC AUTO: 90.8 FL (ref 80–94)
MONOCYTES # BLD AUTO: 0.43 X10(3)/MCL (ref 0.1–1.3)
MONOCYTES NFR BLD AUTO: 8.3 %
NEUTROPHILS # BLD AUTO: 2.96 X10(3)/MCL (ref 2.1–9.2)
NEUTROPHILS NFR BLD AUTO: 56.9 %
PLATELET # BLD AUTO: 181 X10(3)/MCL (ref 130–400)
PMV BLD AUTO: 10.1 FL (ref 7.4–10.4)
POTASSIUM SERPL-SCNC: 3.7 MMOL/L (ref 3.5–5.1)
PROT SERPL-MCNC: 7.2 GM/DL (ref 6.4–8.3)
RBC # BLD AUTO: 4.9 X10(6)/MCL (ref 4.7–6.1)
SODIUM SERPL-SCNC: 139 MMOL/L (ref 136–145)
TRIGL SERPL-MCNC: 178 MG/DL (ref 34–140)
TSH SERPL-ACNC: 2.56 UIU/ML (ref 0.35–4.94)
VLDLC SERPL CALC-MCNC: 36 MG/DL
WBC # SPEC AUTO: 5.2 X10(3)/MCL (ref 4.5–11.5)

## 2024-05-20 PROCEDURE — 84443 ASSAY THYROID STIM HORMONE: CPT

## 2024-05-20 PROCEDURE — 80053 COMPREHEN METABOLIC PANEL: CPT

## 2024-05-20 PROCEDURE — 80061 LIPID PANEL: CPT

## 2024-05-20 PROCEDURE — 83036 HEMOGLOBIN GLYCOSYLATED A1C: CPT

## 2024-05-20 PROCEDURE — 82306 VITAMIN D 25 HYDROXY: CPT

## 2024-05-20 PROCEDURE — 36415 COLL VENOUS BLD VENIPUNCTURE: CPT

## 2024-05-20 PROCEDURE — 85025 COMPLETE CBC W/AUTO DIFF WBC: CPT

## 2024-05-21 ENCOUNTER — OFFICE VISIT (OUTPATIENT)
Dept: FAMILY MEDICINE | Facility: CLINIC | Age: 24
End: 2024-05-21
Payer: COMMERCIAL

## 2024-05-21 VITALS
HEART RATE: 97 BPM | HEIGHT: 72 IN | BODY MASS INDEX: 28.92 KG/M2 | OXYGEN SATURATION: 100 % | TEMPERATURE: 98 F | SYSTOLIC BLOOD PRESSURE: 135 MMHG | DIASTOLIC BLOOD PRESSURE: 83 MMHG | WEIGHT: 213.5 LBS | RESPIRATION RATE: 17 BRPM

## 2024-05-21 DIAGNOSIS — Z97.3 WEARS GLASSES: ICD-10-CM

## 2024-05-21 DIAGNOSIS — R56.9 SEIZURES: Primary | ICD-10-CM

## 2024-05-21 DIAGNOSIS — F32.A DEPRESSION, UNSPECIFIED DEPRESSION TYPE: ICD-10-CM

## 2024-05-21 DIAGNOSIS — Z00.00 WELLNESS EXAMINATION: ICD-10-CM

## 2024-05-21 PROCEDURE — 1159F MED LIST DOCD IN RCRD: CPT | Mod: CPTII,,, | Performed by: NURSE PRACTITIONER

## 2024-05-21 PROCEDURE — 3079F DIAST BP 80-89 MM HG: CPT | Mod: CPTII,,, | Performed by: NURSE PRACTITIONER

## 2024-05-21 PROCEDURE — 99395 PREV VISIT EST AGE 18-39: CPT | Mod: ,,, | Performed by: NURSE PRACTITIONER

## 2024-05-21 PROCEDURE — 3008F BODY MASS INDEX DOCD: CPT | Mod: CPTII,,, | Performed by: NURSE PRACTITIONER

## 2024-05-21 PROCEDURE — 3044F HG A1C LEVEL LT 7.0%: CPT | Mod: CPTII,,, | Performed by: NURSE PRACTITIONER

## 2024-05-21 PROCEDURE — 3075F SYST BP GE 130 - 139MM HG: CPT | Mod: CPTII,,, | Performed by: NURSE PRACTITIONER

## 2024-05-21 RX ORDER — MULTIVITAMIN
1 TABLET ORAL DAILY
COMMUNITY

## 2024-05-21 RX ORDER — CHOLECALCIFEROL (VITAMIN D3) 25 MCG
2000 TABLET ORAL DAILY
COMMUNITY

## 2024-05-21 RX ORDER — AMOXICILLIN 500 MG
1 CAPSULE ORAL DAILY
COMMUNITY

## 2024-05-21 NOTE — PATIENT INSTRUCTIONS
Marvin Raymond,     If you are due for any health screening(s) below please notify me so we can arrange them to be ordered and scheduled. Most healthy patients at your age complete them, but you are free to accept or refuse.     If you can't do it, I'll definitely understand. If you can, I'd certainly appreciate it!    All of your core healthy metrics are met.

## 2024-05-21 NOTE — PROGRESS NOTES
SUBJECTIVE:     History of Present Illness      Chief Complaint: Annual Exam (Discuss lab results.  No complaints at this time)    HPI:  Patient is a 23 y.o. year old male who presents to clinic for annual wellness and lab review.    The patient's general health status is described as good.  History of seizure disorder.  Patient has been seizure-free since 2016.    Doing well with medication taking as prescribed.  Denies headaches, dizziness, chest pain, shortness  of breath, or palpitations.  Smoking status never.  Denies SI, HI or hallucinations.  Review of Systems:    Review of Systems    12 point review of systems conducted, negative except as stated in the history of present illness. See HPI for details.     Previous History    Kayode Rdz, CHRISTIANO  Review of patient's allergies indicates:  No Known Allergies    Past Medical History:   Diagnosis Date    Depression     Seizures      Current Outpatient Medications   Medication Instructions    multivitamin (ONE DAILY MULTIVITAMIN) per tablet 1 tablet, Oral, Daily    omega-3 fatty acids/fish oil (FISH OIL-OMEGA-3 FATTY ACIDS) 300-1,000 mg capsule 1 capsule, Oral, Daily    OXcarbazepine (TRILEPTAL) 600 mg, Oral, 2 times daily    sertraline (ZOLOFT) 100 MG tablet TAKE ONE (1) TABLET BY MOUTH ONCE DAILY    vitamin D (VITAMIN D3) 2,000 Units, Oral, Daily     Past Surgical History:   Procedure Laterality Date    TONSILLECTOMY       No family history on file.    Social History     Tobacco Use    Smoking status: Never    Smokeless tobacco: Never   Substance Use Topics    Alcohol use: Yes     Comment: socially once a week    Drug use: Never        Health Maintenance      Health Maintenance   Topic Date Due    TETANUS VACCINE  07/19/2032    Hepatitis C Screening  Completed    Lipid Panel  Completed    HPV Vaccines  Completed       OBJECTIVE:     Physical Exam      Vital Signs Reviewed   Visit Vitals  /83 (BP Location: Left arm, Patient Position: Sitting)   Pulse 97    Temp 98.3 °F (36.8 °C) (Oral)   Resp 17   Ht 6' (1.829 m)   Wt 96.8 kg (213 lb 8 oz)   SpO2 100%   BMI 28.96 kg/m²       Physical Exam    Physical Exam:  General: Alert, well nourished, no acute distress, non-toxic appearing.   Eyes: Anicteric sclera, without conjunctival injection, normal lids, no purulent drainage, EOMs grossly intact.   Ears: No tragal tenderness. Tympanic membranes intact, pearly grey, without effusion or erythema and with a positive light reflex.   Mouth: Posterior pharynx without erythema. No exudate, ulcerations, or lesion. No tonsillar swelling.   Neck: Supple, full ROM, no rigidity, no cervical adenopathy.   Cardio: Normal rate and rhythm    Resp: Respirations even and unlabored, clear to auscultation bilaterally.   Abd: No ecchymosis or distension. Normal bowel sounds in all 4 quadrants. No tenderness to palpation. No rebound tenderness or guarding. No CVA tenderness.   Skin: No rashes or open lesions noted.   MSK: No swelling. No abrasions or signs of trauma. Ambulating without assistance.   Neuro: Alert,oriented No focal deficits noted. Facial expressions even.   Psych: Cooperative, Normal affect      Procedures    Procedures     Labs     Results for orders placed or performed in visit on 05/20/24   Comprehensive Metabolic Panel   Result Value Ref Range    Sodium 139 136 - 145 mmol/L    Potassium 3.7 3.5 - 5.1 mmol/L    Chloride 105 98 - 107 mmol/L    CO2 29 22 - 29 mmol/L    Glucose 95 74 - 100 mg/dL    Blood Urea Nitrogen 17.9 8.9 - 20.6 mg/dL    Creatinine 1.16 0.73 - 1.18 mg/dL    Calcium 9.4 8.4 - 10.2 mg/dL    Protein Total 7.2 6.4 - 8.3 gm/dL    Albumin 4.1 3.5 - 5.0 g/dL    Globulin 3.1 2.4 - 3.5 gm/dL    Albumin/Globulin Ratio 1.3 1.1 - 2.0 ratio    Bilirubin Total 0.5 <=1.5 mg/dL    ALP 64 40 - 150 unit/L    ALT 20 0 - 55 unit/L    AST 18 5 - 34 unit/L    eGFR >60 mL/min/1.73/m2    Anion Gap 5.0 mEq/L    BUN/Creatinine Ratio 15    Lipid Panel   Result Value Ref Range     Cholesterol Total 171 <=200 mg/dL    HDL Cholesterol 36 35 - 60 mg/dL    Triglyceride 178 (H) 34 - 140 mg/dL    Cholesterol/HDL Ratio 5 0 - 5    Very Low Density Lipoprotein 36     LDL Cholesterol 99.00 50.00 - 140.00 mg/dL   TSH   Result Value Ref Range    TSH 2.557 0.350 - 4.940 uIU/mL   Hemoglobin A1C   Result Value Ref Range    Hemoglobin A1c 5.0 <=7.0 %    Estimated Average Glucose 96.8 mg/dL   Vitamin D   Result Value Ref Range    Vitamin D 45 30 - 80 ng/mL   CBC with Differential   Result Value Ref Range    WBC 5.20 4.50 - 11.50 x10(3)/mcL    RBC 4.90 4.70 - 6.10 x10(6)/mcL    Hgb 15.4 14.0 - 18.0 g/dL    Hct 44.5 42.0 - 52.0 %    MCV 90.8 80.0 - 94.0 fL    MCH 31.4 (H) 27.0 - 31.0 pg    MCHC 34.6 33.0 - 36.0 g/dL    RDW 12.1 11.5 - 17.0 %    Platelet 181 130 - 400 x10(3)/mcL    MPV 10.1 7.4 - 10.4 fL    Neut % 56.9 %    Lymph % 26.9 %    Mono % 8.3 %    Eos % 6.7 %    Basophil % 0.8 %    Lymph # 1.40 0.6 - 4.6 x10(3)/mcL    Neut # 2.96 2.1 - 9.2 x10(3)/mcL    Mono # 0.43 0.1 - 1.3 x10(3)/mcL    Eos # 0.35 0 - 0.9 x10(3)/mcL    Baso # 0.04 <=0.2 x10(3)/mcL    IG# 0.02 0 - 0.04 x10(3)/mcL    IG% 0.4 %       Chemistry:  Lab Results   Component Value Date     05/20/2024    K 3.7 05/20/2024    CHLORIDE 105 05/20/2024    BUN 17.9 05/20/2024    CREATININE 1.16 05/20/2024    EGFRNORACEVR >60 05/20/2024    GLUCOSE 95 05/20/2024    CALCIUM 9.4 05/20/2024    ALKPHOS 64 05/20/2024    LABPROT 7.2 05/20/2024    ALBUMIN 4.1 05/20/2024    BILIDIR 0.2 04/06/2022    IBILI 0.30 04/06/2022    AST 18 05/20/2024    ALT 20 05/20/2024    [id number] 45 05/20/2024    TSH 2.557 05/20/2024        Lab Results   Component Value Date    HGBA1C 5.0 05/20/2024        Hematology:  Lab Results   Component Value Date    WBC 5.20 05/20/2024    HGB 15.4 05/20/2024    HCT 44.5 05/20/2024     05/20/2024       Lipid Panel:  Lab Results   Component Value Date    CHOL 171 05/20/2024    HDL 36 05/20/2024    LDL 99.00 05/20/2024    TRIG  178 (H) 05/20/2024    TOTALCHOLEST 5 05/20/2024        Urine:  Lab Results   Component Value Date    PHUA 5.0 02/05/2020         Assessment            ICD-10-CM ICD-9-CM   1. Seizures  R56.9 780.39   2. Wellness examination  Z00.00 V70.0   3. Depression, unspecified depression type  F32.A 311   4. Wears glasses  Z97.3 V49.89       Plan       1. Wellness examination  Discussed labs and preventative screenings   Overall health status reviewed.    Significant chronic conditions addressed, including ongoing treatment plans.   Good health habits reinforced.    Cardiovascular disease risk factors discussed.   Appropriate recommendations and preventative care medical   information provided with annual wellness exams encouraged.  Vaccination status       2. Seizures  Stable no seizure activity since 2016.  Continue Trileptal as prescribed.  3. Depression, unspecified depression type  Stable continue medication as prescribed.  Denies SI, HI hallucinations.    Establish good family/social support, reading, meditation, physical activity.  ED precautions discussed    4. Wears glasses  Last eye exam within a year      Medication List with Changes/Refills   Current Medications    MULTIVITAMIN (ONE DAILY MULTIVITAMIN) PER TABLET    Take 1 tablet by mouth once daily.    OMEGA-3 FATTY ACIDS/FISH OIL (FISH OIL-OMEGA-3 FATTY ACIDS) 300-1,000 MG CAPSULE    Take 1 capsule by mouth once daily.    OXCARBAZEPINE (TRILEPTAL) 600 MG TAB    Take 1 tablet (600 mg total) by mouth 2 (two) times daily.    SERTRALINE (ZOLOFT) 100 MG TABLET    TAKE ONE (1) TABLET BY MOUTH ONCE DAILY    VITAMIN D (VITAMIN D3) 1000 UNITS TAB    Take 2,000 Units by mouth once daily.   Discontinued Medications    PREDNISONE (DELTASONE) 20 MG TABLET    One tablet orally twice daily for 3 days and then once daily for 2 days       Follow up in about 6 months (around 11/21/2024) for 6 MONTH FOLLOW UP.   Follow up in about 6 months (around 11/21/2024) for 6 MONTH FOLLOW UP.  In addition to their scheduled follow up, the patient has also been instructed to follow up on as needed basis.   Future Appointments   Date Time Provider Department Center   11/21/2024  8:00 AM Kayode Rdz FNP [facility]   5/21/2025  8:00 AM Kayode Rdz FNP [facility]

## 2024-06-12 DIAGNOSIS — F84.0 AUTISM: ICD-10-CM

## 2024-06-12 RX ORDER — SERTRALINE HYDROCHLORIDE 100 MG/1
TABLET, FILM COATED ORAL
Qty: 30 TABLET | Refills: 2 | Status: SHIPPED | OUTPATIENT
Start: 2024-06-12

## 2024-08-08 DIAGNOSIS — G40.909 SEIZURE DISORDER: ICD-10-CM

## 2024-08-08 RX ORDER — OXCARBAZEPINE 600 MG/1
TABLET, FILM COATED ORAL
Qty: 60 TABLET | Refills: 3 | Status: SHIPPED | OUTPATIENT
Start: 2024-08-08

## 2024-11-11 DIAGNOSIS — F84.0 AUTISM: ICD-10-CM

## 2024-11-11 RX ORDER — SERTRALINE HYDROCHLORIDE 100 MG/1
TABLET, FILM COATED ORAL
Qty: 30 TABLET | Refills: 2 | Status: SHIPPED | OUTPATIENT
Start: 2024-11-11

## 2024-11-14 ENCOUNTER — TELEPHONE (OUTPATIENT)
Dept: FAMILY MEDICINE | Facility: CLINIC | Age: 24
End: 2024-11-14
Payer: COMMERCIAL

## 2024-11-21 ENCOUNTER — OFFICE VISIT (OUTPATIENT)
Dept: FAMILY MEDICINE | Facility: CLINIC | Age: 24
End: 2024-11-21
Payer: COMMERCIAL

## 2024-11-21 VITALS
HEIGHT: 72 IN | HEART RATE: 76 BPM | SYSTOLIC BLOOD PRESSURE: 138 MMHG | DIASTOLIC BLOOD PRESSURE: 83 MMHG | WEIGHT: 215.13 LBS | BODY MASS INDEX: 29.14 KG/M2 | TEMPERATURE: 98 F

## 2024-11-21 DIAGNOSIS — G40.909 SEIZURE DISORDER: ICD-10-CM

## 2024-11-21 DIAGNOSIS — F84.0 AUTISM: ICD-10-CM

## 2024-11-21 PROCEDURE — 3079F DIAST BP 80-89 MM HG: CPT | Mod: CPTII,,, | Performed by: NURSE PRACTITIONER

## 2024-11-21 PROCEDURE — 99213 OFFICE O/P EST LOW 20 MIN: CPT | Mod: ,,, | Performed by: NURSE PRACTITIONER

## 2024-11-21 PROCEDURE — 1160F RVW MEDS BY RX/DR IN RCRD: CPT | Mod: CPTII,,, | Performed by: NURSE PRACTITIONER

## 2024-11-21 PROCEDURE — 3044F HG A1C LEVEL LT 7.0%: CPT | Mod: CPTII,,, | Performed by: NURSE PRACTITIONER

## 2024-11-21 PROCEDURE — 1159F MED LIST DOCD IN RCRD: CPT | Mod: CPTII,,, | Performed by: NURSE PRACTITIONER

## 2024-11-21 PROCEDURE — 3008F BODY MASS INDEX DOCD: CPT | Mod: CPTII,,, | Performed by: NURSE PRACTITIONER

## 2024-11-21 PROCEDURE — 3075F SYST BP GE 130 - 139MM HG: CPT | Mod: CPTII,,, | Performed by: NURSE PRACTITIONER

## 2024-11-21 RX ORDER — SERTRALINE HYDROCHLORIDE 100 MG/1
100 TABLET, FILM COATED ORAL DAILY
Qty: 90 TABLET | Refills: 1 | Status: SHIPPED | OUTPATIENT
Start: 2024-11-21

## 2024-11-21 RX ORDER — OXCARBAZEPINE 600 MG/1
600 TABLET, FILM COATED ORAL 2 TIMES DAILY
Qty: 180 TABLET | Refills: 1 | Status: SHIPPED | OUTPATIENT
Start: 2024-11-21

## 2024-11-21 NOTE — PROGRESS NOTES
SUBJECTIVE:     History of Present Illness      Chief Complaint: Follow-up (6 month f/u patient states no depression , request 60 day medicine refill on all meds if possible .)    HPI:  Patient is a 24 y.o. year old male who presents to clinic for depression follow-up.  Patient reports doing well with medication.  No reported seizures.  Doing well denies SI, HI or hallucinations.    Review of Systems:    Review of Systems    12 point review of systems conducted, negative except as stated in the history of present illness. See HPI for details.     Previous History    Kayode dRz, CHRISTIANO  Review of patient's allergies indicates:  No Known Allergies    Past Medical History:   Diagnosis Date    Depression     Seizures      Current Outpatient Medications   Medication Instructions    multivitamin (ONE DAILY MULTIVITAMIN) per tablet 1 tablet, Daily    omega-3 fatty acids/fish oil (FISH OIL-OMEGA-3 FATTY ACIDS) 300-1,000 mg capsule 1 capsule, Daily    OXcarbazepine (TRILEPTAL) 600 mg, Oral, 2 times daily    sertraline (ZOLOFT) 100 mg, Oral, Daily    vitamin D (VITAMIN D3) 2,000 Units, Daily     Past Surgical History:   Procedure Laterality Date    TONSILLECTOMY       No family history on file.    Social History     Tobacco Use    Smoking status: Never    Smokeless tobacco: Never   Substance Use Topics    Alcohol use: Yes     Comment: socially once a week    Drug use: Never        Health Maintenance      Health Maintenance   Topic Date Due    TETANUS VACCINE  07/19/2032    Hepatitis C Screening  Completed    Lipid Panel  Completed    HPV Vaccines  Completed       OBJECTIVE:     Physical Exam      Vital Signs Reviewed   Visit Vitals  /83   Pulse 76   Temp 98.2 °F (36.8 °C)   Ht 6' (1.829 m)   Wt 97.6 kg (215 lb 1.6 oz)   SpO2 (P) 98%   BMI 29.17 kg/m²       Physical Exam    Physical Exam:  General: Alert, well nourished, no acute distress, non-toxic appearing.   Eyes: Anicteric sclera, without conjunctival injection,  normal lids, no purulent drainage, EOMs grossly intact.   Ears: No tragal tenderness. Tympanic membranes intact, pearly grey, without effusion or erythema and with a positive light reflex.   Mouth: Posterior pharynx without erythema. No exudate, ulcerations, or lesion. No tonsillar swelling.   Neck: Supple, full ROM, no rigidity, no cervical adenopathy.   Cardio: Normal rate and rhythm    Resp: Respirations even and unlabored, clear to auscultation bilaterally.   Abd: No ecchymosis or distension. Normal bowel sounds in all 4 quadrants. No tenderness to palpation. No rebound tenderness or guarding. No CVA tenderness.   Skin: No rashes or open lesions noted.   MSK: No swelling. No abrasions or signs of trauma. Ambulating without assistance.   Neuro: Alert,oriented No focal deficits noted. Facial expressions even.   Psych: Cooperative, Normal affect      Procedures    Procedures     Labs     Results for orders placed or performed in visit on 05/20/24   Comprehensive Metabolic Panel    Collection Time: 05/20/24  7:20 AM   Result Value Ref Range    Sodium 139 136 - 145 mmol/L    Potassium 3.7 3.5 - 5.1 mmol/L    Chloride 105 98 - 107 mmol/L    CO2 29 22 - 29 mmol/L    Glucose 95 74 - 100 mg/dL    Blood Urea Nitrogen 17.9 8.9 - 20.6 mg/dL    Creatinine 1.16 0.73 - 1.18 mg/dL    Calcium 9.4 8.4 - 10.2 mg/dL    Protein Total 7.2 6.4 - 8.3 gm/dL    Albumin 4.1 3.5 - 5.0 g/dL    Globulin 3.1 2.4 - 3.5 gm/dL    Albumin/Globulin Ratio 1.3 1.1 - 2.0 ratio    Bilirubin Total 0.5 <=1.5 mg/dL    ALP 64 40 - 150 unit/L    ALT 20 0 - 55 unit/L    AST 18 5 - 34 unit/L    eGFR >60 mL/min/1.73/m2    Anion Gap 5.0 mEq/L    BUN/Creatinine Ratio 15    Lipid Panel    Collection Time: 05/20/24  7:20 AM   Result Value Ref Range    Cholesterol Total 171 <=200 mg/dL    HDL Cholesterol 36 35 - 60 mg/dL    Triglyceride 178 (H) 34 - 140 mg/dL    Cholesterol/HDL Ratio 5 0 - 5    Very Low Density Lipoprotein 36     LDL Cholesterol 99.00 50.00 -  140.00 mg/dL   TSH    Collection Time: 05/20/24  7:20 AM   Result Value Ref Range    TSH 2.557 0.350 - 4.940 uIU/mL   Hemoglobin A1C    Collection Time: 05/20/24  7:20 AM   Result Value Ref Range    Hemoglobin A1c 5.0 <=7.0 %    Estimated Average Glucose 96.8 mg/dL   Vitamin D    Collection Time: 05/20/24  7:20 AM   Result Value Ref Range    Vitamin D 45 30 - 80 ng/mL   CBC with Differential    Collection Time: 05/20/24  7:20 AM   Result Value Ref Range    WBC 5.20 4.50 - 11.50 x10(3)/mcL    RBC 4.90 4.70 - 6.10 x10(6)/mcL    Hgb 15.4 14.0 - 18.0 g/dL    Hct 44.5 42.0 - 52.0 %    MCV 90.8 80.0 - 94.0 fL    MCH 31.4 (H) 27.0 - 31.0 pg    MCHC 34.6 33.0 - 36.0 g/dL    RDW 12.1 11.5 - 17.0 %    Platelet 181 130 - 400 x10(3)/mcL    MPV 10.1 7.4 - 10.4 fL    Neut % 56.9 %    Lymph % 26.9 %    Mono % 8.3 %    Eos % 6.7 %    Basophil % 0.8 %    Lymph # 1.40 0.6 - 4.6 x10(3)/mcL    Neut # 2.96 2.1 - 9.2 x10(3)/mcL    Mono # 0.43 0.1 - 1.3 x10(3)/mcL    Eos # 0.35 0 - 0.9 x10(3)/mcL    Baso # 0.04 <=0.2 x10(3)/mcL    IG# 0.02 0 - 0.04 x10(3)/mcL    IG% 0.4 %       Chemistry:  Lab Results   Component Value Date     05/20/2024    K 3.7 05/20/2024    BUN 17.9 05/20/2024    CREATININE 1.16 05/20/2024    EGFRNORACEVR >60 05/20/2024    GLUCOSE 95 05/20/2024    CALCIUM 9.4 05/20/2024    ALKPHOS 64 05/20/2024    LABPROT 7.2 05/20/2024    ALBUMIN 4.1 05/20/2024    BILIDIR 0.2 04/06/2022    IBILI 0.30 04/06/2022    AST 18 05/20/2024    ALT 20 05/20/2024    [id number] 45 05/20/2024    TSH 2.557 05/20/2024        Lab Results   Component Value Date    HGBA1C 5.0 05/20/2024        Hematology:  Lab Results   Component Value Date    WBC 5.20 05/20/2024    HGB 15.4 05/20/2024    HCT 44.5 05/20/2024     05/20/2024       Lipid Panel:  Lab Results   Component Value Date    CHOL 171 05/20/2024    HDL 36 05/20/2024    LDL 99.00 05/20/2024    TRIG 178 (H) 05/20/2024    TOTALCHOLEST 5 05/20/2024        Urine:  No results found for:  ""COLORUA", "APPEARANCEUA", "SGUA", "PHUA", "PROTEINUA", "GLUCOSEUA", "KETONESUA", "BLOODUA", "NITRITESUA", "LEUKOCYTESUR", "RBCUA", "WBCUA", "BACTERIA", "SQEPUA", "HYALINECASTS", "CREATRANDUR", "PROTEINURINE", "UPROTCREA"      Assessment            ICD-10-CM ICD-9-CM   1. Autism  F84.0 299.00   2. Seizure disorder  G40.909 345.90       Plan       1. Autism  - sertraline (ZOLOFT) 100 MG tablet; Take 1 tablet (100 mg total) by mouth once daily.  Dispense: 90 tablet; Refill: 1  Denies SI, HI hallucinations.    Establish good family/social support, reading, meditation, physical activity.  ED precautions discussed    2. Seizure disorder  - OXcarbazepine (TRILEPTAL) 600 MG Tab; Take 1 tablet (600 mg total) by mouth 2 (two) times daily.  Dispense: 180 tablet; Refill: 1  Stable, no seizure activity since 2016.   Continue Trileptal as prescribed.     Orders Placed This Encounter    sertraline (ZOLOFT) 100 MG tablet    OXcarbazepine (TRILEPTAL) 600 MG Tab      Medication List with Changes/Refills   Current Medications    MULTIVITAMIN (ONE DAILY MULTIVITAMIN) PER TABLET    Take 1 tablet by mouth once daily.    OMEGA-3 FATTY ACIDS/FISH OIL (FISH OIL-OMEGA-3 FATTY ACIDS) 300-1,000 MG CAPSULE    Take 1 capsule by mouth once daily.    VITAMIN D (VITAMIN D3) 1000 UNITS TAB    Take 2,000 Units by mouth once daily.   Changed and/or Refilled Medications    Modified Medication Previous Medication    OXCARBAZEPINE (TRILEPTAL) 600 MG TAB OXcarbazepine (TRILEPTAL) 600 MG Tab       Take 1 tablet (600 mg total) by mouth 2 (two) times daily.    TAKE ONE (1) TABLET BY MOUTH TWICE DAILY    SERTRALINE (ZOLOFT) 100 MG TABLET sertraline (ZOLOFT) 100 MG tablet       Take 1 tablet (100 mg total) by mouth once daily.    TAKE ONE (1) TABLET BY MOUTH ONCE DAILY       Follow up if symptoms worsen or fail to improve, for Wellness already scheduled, LABS Prior.   Follow up if symptoms worsen or fail to improve, for Wellness already scheduled, LABS Prior. " In addition to their scheduled follow up, the patient has also been instructed to follow up on as needed basis.   Future Appointments   Date Time Provider Department Center   5/21/2025  8:00 AM Kayode Rdz FNP [facility]

## 2025-07-03 ENCOUNTER — OFFICE VISIT (OUTPATIENT)
Dept: FAMILY MEDICINE | Facility: CLINIC | Age: 25
End: 2025-07-03
Payer: COMMERCIAL

## 2025-07-03 VITALS
RESPIRATION RATE: 18 BRPM | HEIGHT: 72 IN | WEIGHT: 211.88 LBS | HEART RATE: 82 BPM | SYSTOLIC BLOOD PRESSURE: 123 MMHG | TEMPERATURE: 99 F | OXYGEN SATURATION: 97 % | DIASTOLIC BLOOD PRESSURE: 80 MMHG | BODY MASS INDEX: 28.7 KG/M2

## 2025-07-03 DIAGNOSIS — G40.909 SEIZURE DISORDER: ICD-10-CM

## 2025-07-03 DIAGNOSIS — F84.0 AUTISM: ICD-10-CM

## 2025-07-03 DIAGNOSIS — Z00.00 WELLNESS EXAMINATION: Primary | ICD-10-CM

## 2025-07-03 RX ORDER — SERTRALINE HYDROCHLORIDE 100 MG/1
100 TABLET, FILM COATED ORAL DAILY
Qty: 90 TABLET | Refills: 1 | Status: SHIPPED | OUTPATIENT
Start: 2025-07-03

## 2025-07-03 RX ORDER — OXCARBAZEPINE 600 MG/1
600 TABLET, FILM COATED ORAL 2 TIMES DAILY
Qty: 180 TABLET | Refills: 1 | Status: SHIPPED | OUTPATIENT
Start: 2025-07-03

## 2025-07-03 NOTE — PROGRESS NOTES
SUBJECTIVE:     History of Present Illness      Chief Complaint: Annual Exam (No questions or concerns today )    HPI:  Patient is a 25 y.o. year old male who presents to clinic for annual wellness     Review of Systems:    Review of Systems    12 point review of systems conducted, negative except as stated in the history of present illness. See HPI for details.     Previous History      PCP: Kayode Rdz FNP  Review of patient's allergies indicates:  No Known Allergies    Past Medical History:   Diagnosis Date    Depression     Seizures        Past Surgical History:   Procedure Laterality Date    TONSILLECTOMY       No family history on file.    Social History[1]     Health Maintenance      Health Maintenance   Topic Date Due    HIV Screening  Never done    COVID-19 Vaccine (1 - 2024-25 season) Never done    Influenza Vaccine (1) 09/01/2025    TETANUS VACCINE  07/19/2032    RSV Vaccine (Age 60+ and Pregnant patients) (1 - 1-dose 75+ series) 06/21/2074    Hepatitis C Screening  Completed    Lipid Panel  Completed    HPV Vaccines  Completed    Pneumococcal Vaccines (Age 0-49)  Aged Out       OBJECTIVE:     Physical Exam      Vital Signs Reviewed   Visit Vitals  /80 (BP Location: Right arm, Patient Position: Sitting)   Pulse 82   Temp 98.8 °F (37.1 °C) (Oral)   Resp 18   Ht 6' (1.829 m)   Wt 96.1 kg (211 lb 14.4 oz)   SpO2 97%   BMI 28.74 kg/m²       Physical Exam    Physical Exam:  General: Alert, well nourished, no acute distress, non-toxic appearing.   Eyes: Anicteric sclera, without conjunctival injection, normal lids, no purulent drainage, EOMs grossly intact.   Ears: No tragal tenderness. Tympanic membranes intact, pearly grey, without effusion or erythema and with a positive light reflex.   Mouth: Posterior pharynx without erythema. No exudate, ulcerations, or lesion. No tonsillar swelling.   Neck: Supple, full ROM, no rigidity, no cervical adenopathy.   Cardio: Normal rate and rhythm    Resp:  "Respirations even and unlabored, clear to auscultation bilaterally.   Abd: No ecchymosis or distension. Normal bowel sounds in all 4 quadrants. No tenderness to palpation. No rebound tenderness or guarding. No CVA tenderness.   Skin: No rashes or open lesions noted.   MSK: No swelling. No abrasions or signs of trauma. Ambulating without assistance.   Neuro: Alert,oriented No focal deficits noted. Facial expressions even.   Psych: Cooperative, Normal affect      Labs   Chemistry:  Lab Results   Component Value Date     05/20/2024    K 3.7 05/20/2024    BUN 17.9 05/20/2024    CREATININE 1.16 05/20/2024    EGFRNORACEVR >60 05/20/2024    CALCIUM 9.4 05/20/2024    ALKPHOS 64 05/20/2024    ALBUMIN 4.1 05/20/2024    BILIDIR 0.2 04/06/2022    IBILI 0.30 04/06/2022    AST 18 05/20/2024    ALT 20 05/20/2024    [id number] 45 05/20/2024    TSH 2.557 05/20/2024        Lab Results   Component Value Date    HGBA1C 5.0 05/20/2024        Hematology:  Lab Results   Component Value Date    WBC 5.20 05/20/2024    HGB 15.4 05/20/2024    HCT 44.5 05/20/2024     05/20/2024       Lipid Panel:  Lab Results   Component Value Date    CHOL 171 05/20/2024    HDL 36 05/20/2024    LDL 99.00 05/20/2024    TRIG 178 (H) 05/20/2024    TOTALCHOLEST 5 05/20/2024        Urine:  No results found for: "COLORUA", "APPEARANCEUA", "SGUA", "PHUA", "PROTEINUA", "GLUCOSEUA", "KETONESUA", "BLOODUA", "NITRITESUA", "LEUKOCYTESUR", "RBCUA", "WBCUA", "BACTERIA", "SQEPUA", "HYALINECASTS", "CREATRANDUR", "PROTEINURINE", "UPROTCREA"      Assessment         ICD-10-CM ICD-9-CM   1. Wellness examination  Z00.00 V70.0   2. Autism  F84.0 299.00   3. Seizure disorder  G40.909 345.90       Plan       Assessment & Plan  Wellness examination  Ordered  labs and preventative screenings   Overall health status reviewed.    Significant chronic conditions addressed, including ongoing treatment plans.   Good health habits reinforced.    Cardiovascular disease risk " factors discussed.   Appropriate recommendations and preventative care medical   information provided with annual wellness exams encouraged.  Vaccination status        Orders:    CBC Auto Differential; Future    Comprehensive Metabolic Panel; Future    Lipid Panel; Future    TSH; Future    Hemoglobin A1C; Future    Urinalysis; Future    CBC Auto Differential; Future    Comprehensive Metabolic Panel; Future    Lipid Panel; Future    TSH; Future    Hemoglobin A1C; Future    Urinalysis; Future    Vitamin D; Future    Vitamin D; Future    Autism  Stable continue current medication   Denies SI, HI hallucinations.   Establish good family/social support, reading, meditation, physical activity exercise.  Avoid/limit caffeine, alcohol and stimulants.  Practice positive phrases  and relaxation techniques.  Set healthy boundaries, avoid people and conversations that increase stress.  ED precautions discussed      Orders:    sertraline (ZOLOFT) 100 MG tablet; Take 1 tablet (100 mg total) by mouth once daily.    Seizure disorder  Stable, no seizure activity since 2016    Orders:    OXcarbazepine (TRILEPTAL) 600 MG Tab; Take 1 tablet (600 mg total) by mouth 2 (two) times daily.       Orders Placed This Encounter    CBC Auto Differential    Comprehensive Metabolic Panel    Lipid Panel    TSH    Hemoglobin A1C    Urinalysis    CBC Auto Differential    Comprehensive Metabolic Panel    Lipid Panel    TSH    Hemoglobin A1C    Urinalysis    Vitamin D    Vitamin D    OXcarbazepine (TRILEPTAL) 600 MG Tab    sertraline (ZOLOFT) 100 MG tablet      Medication List with Changes/Refills   Current Medications    MULTIVITAMIN (ONE DAILY MULTIVITAMIN) PER TABLET    Take 1 tablet by mouth once daily.    OMEGA-3 FATTY ACIDS/FISH OIL (FISH OIL-OMEGA-3 FATTY ACIDS) 300-1,000 MG CAPSULE    Take 1 capsule by mouth once daily.    VITAMIN D (VITAMIN D3) 1000 UNITS TAB    Take 2,000 Units by mouth once daily.   Changed and/or Refilled Medications     Modified Medication Previous Medication    OXCARBAZEPINE (TRILEPTAL) 600 MG TAB OXcarbazepine (TRILEPTAL) 600 MG Tab       Take 1 tablet (600 mg total) by mouth 2 (two) times daily.    Take 1 tablet (600 mg total) by mouth 2 (two) times daily.    SERTRALINE (ZOLOFT) 100 MG TABLET sertraline (ZOLOFT) 100 MG tablet       Take 1 tablet (100 mg total) by mouth once daily.    Take 1 tablet (100 mg total) by mouth once daily.         Follow up in about 6 months (around 1/3/2026), or if symptoms worsen or fail to improve, for 6 MONTH FOLLOW UP. In addition to their scheduled follow up, the patient has also been instructed to follow up on as needed basis.   Future Appointments   Date Time Provider Department Center   7/3/2025 10:45 AM Kayode Rdz FNP [facility]   1/7/2026  8:30 AM Kayode Rdz FNP [facility]       CHRISTIANO Ford                [1]   Social History  Tobacco Use    Smoking status: Never     Passive exposure: Never    Smokeless tobacco: Never   Substance Use Topics    Alcohol use: Yes     Comment: socially once a week    Drug use: Never

## 2025-07-19 ENCOUNTER — LAB VISIT (OUTPATIENT)
Dept: LAB | Facility: HOSPITAL | Age: 25
End: 2025-07-19
Attending: NURSE PRACTITIONER
Payer: COMMERCIAL

## 2025-07-19 DIAGNOSIS — Z00.00 WELLNESS EXAMINATION: ICD-10-CM

## 2025-07-19 LAB
25(OH)D3+25(OH)D2 SERPL-MCNC: 56 NG/ML (ref 30–80)
ALBUMIN SERPL-MCNC: 4.1 G/DL (ref 3.5–5)
ALBUMIN/GLOB SERPL: 1.3 RATIO (ref 1.1–2)
ALP SERPL-CCNC: 65 UNIT/L (ref 40–150)
ALT SERPL-CCNC: 19 UNIT/L (ref 0–55)
ANION GAP SERPL CALC-SCNC: 6 MEQ/L
AST SERPL-CCNC: 16 UNIT/L (ref 11–45)
BASOPHILS # BLD AUTO: 0.04 X10(3)/MCL
BASOPHILS NFR BLD AUTO: 0.8 %
BILIRUB SERPL-MCNC: 0.4 MG/DL
BUN SERPL-MCNC: 21 MG/DL (ref 8.9–20.6)
CALCIUM SERPL-MCNC: 8.9 MG/DL (ref 8.4–10.2)
CHLORIDE SERPL-SCNC: 111 MMOL/L (ref 98–107)
CHOLEST SERPL-MCNC: 182 MG/DL
CHOLEST/HDLC SERPL: 5 {RATIO} (ref 0–5)
CO2 SERPL-SCNC: 23 MMOL/L (ref 22–29)
CREAT SERPL-MCNC: 1.15 MG/DL (ref 0.72–1.25)
CREAT/UREA NIT SERPL: 18
EOSINOPHIL # BLD AUTO: 0.16 X10(3)/MCL (ref 0–0.9)
EOSINOPHIL NFR BLD AUTO: 3.1 %
ERYTHROCYTE [DISTWIDTH] IN BLOOD BY AUTOMATED COUNT: 11.9 % (ref 11.5–17)
EST. AVERAGE GLUCOSE BLD GHB EST-MCNC: 99.7 MG/DL
GFR SERPLBLD CREATININE-BSD FMLA CKD-EPI: >60 ML/MIN/1.73/M2
GLOBULIN SER-MCNC: 3.1 GM/DL (ref 2.4–3.5)
GLUCOSE SERPL-MCNC: 93 MG/DL (ref 74–100)
HBA1C MFR BLD: 5.1 %
HCT VFR BLD AUTO: 44.6 % (ref 42–52)
HDLC SERPL-MCNC: 35 MG/DL (ref 35–60)
HGB BLD-MCNC: 15.6 G/DL (ref 14–18)
IMM GRANULOCYTES # BLD AUTO: 0.01 X10(3)/MCL (ref 0–0.04)
IMM GRANULOCYTES NFR BLD AUTO: 0.2 %
LDLC SERPL CALC-MCNC: 119 MG/DL (ref 50–140)
LYMPHOCYTES # BLD AUTO: 1.43 X10(3)/MCL (ref 0.6–4.6)
LYMPHOCYTES NFR BLD AUTO: 27.3 %
MCH RBC QN AUTO: 31.3 PG (ref 27–31)
MCHC RBC AUTO-ENTMCNC: 35 G/DL (ref 33–36)
MCV RBC AUTO: 89.4 FL (ref 80–94)
MONOCYTES # BLD AUTO: 0.29 X10(3)/MCL (ref 0.1–1.3)
MONOCYTES NFR BLD AUTO: 5.5 %
NEUTROPHILS # BLD AUTO: 3.3 X10(3)/MCL (ref 2.1–9.2)
NEUTROPHILS NFR BLD AUTO: 63.1 %
PLATELET # BLD AUTO: 196 X10(3)/MCL (ref 130–400)
PMV BLD AUTO: 10.7 FL (ref 7.4–10.4)
POTASSIUM SERPL-SCNC: 4.2 MMOL/L (ref 3.5–5.1)
PROT SERPL-MCNC: 7.2 GM/DL (ref 6.4–8.3)
RBC # BLD AUTO: 4.99 X10(6)/MCL (ref 4.7–6.1)
SODIUM SERPL-SCNC: 140 MMOL/L (ref 136–145)
TRIGL SERPL-MCNC: 138 MG/DL (ref 34–140)
TSH SERPL-ACNC: 1.81 UIU/ML (ref 0.35–4.94)
VLDLC SERPL CALC-MCNC: 28 MG/DL
WBC # BLD AUTO: 5.23 X10(3)/MCL (ref 4.5–11.5)

## 2025-07-19 PROCEDURE — 85025 COMPLETE CBC W/AUTO DIFF WBC: CPT

## 2025-07-19 PROCEDURE — 84443 ASSAY THYROID STIM HORMONE: CPT

## 2025-07-19 PROCEDURE — 83036 HEMOGLOBIN GLYCOSYLATED A1C: CPT

## 2025-07-19 PROCEDURE — 36415 COLL VENOUS BLD VENIPUNCTURE: CPT

## 2025-07-19 PROCEDURE — 80061 LIPID PANEL: CPT

## 2025-07-19 PROCEDURE — 80053 COMPREHEN METABOLIC PANEL: CPT

## 2025-07-19 PROCEDURE — 82306 VITAMIN D 25 HYDROXY: CPT
